# Patient Record
Sex: FEMALE | Race: WHITE
[De-identification: names, ages, dates, MRNs, and addresses within clinical notes are randomized per-mention and may not be internally consistent; named-entity substitution may affect disease eponyms.]

---

## 2018-02-03 ENCOUNTER — HOSPITAL ENCOUNTER (EMERGENCY)
Dept: HOSPITAL 44 - ED | Age: 38
Discharge: HOME | End: 2018-02-03
Payer: SELF-PAY

## 2018-02-03 DIAGNOSIS — R19.7: ICD-10-CM

## 2018-02-03 DIAGNOSIS — J06.9: Primary | ICD-10-CM

## 2018-02-03 PROCEDURE — 99282 EMERGENCY DEPT VISIT SF MDM: CPT

## 2018-02-03 PROCEDURE — A9270 NON-COVERED ITEM OR SERVICE: HCPCS

## 2018-02-03 NOTE — ED PHYSICIAN DOCUMENTATION
General Adult





- HISTORIAN


Historian: patient





- HPI


Stated Complaint: ear pain, muscle ache, diarrhea


Chief Complaint: General Adult


Onset: days ago (3)


Timing: still present


Severity: moderate


Further Comments: yes (Pt is a 38 yo female)





- ROS


CONST: other (malaise)


EYES/ENT: other (L ear pain)


CVS/RESP: cough


GI/: diarrhea


MS/SKIN/LYMPH: none





- PAST HX


Past History: other (migraine headaches)


Surgeries/Procedures: cholecystectomy, other (tonsillectomy)


Allergies/Adverse Reactions: 


 Allergies











Allergy/AdvReac Type Severity Reaction Status Date / Time


 


azithromycin Allergy   Verified 02/03/18 22:24





[From Zithromax Z-Arun]     


 


cephalexin monohydrate Allergy   Verified 02/03/18 22:24





[From Keflex]     


 


ciprofloxacin [From Cipro] Allergy   Verified 02/03/18 22:24


 


ciprofloxacin HCl Allergy   Verified 02/03/18 22:24





[From Cipro]     


 


morphine Allergy   Verified 02/03/18 22:24


 


Penicillins Allergy   Verified 02/03/18 22:24














Home Medications: 


 Ambulatory Orders











 Medication  Instructions  Recorded


 


Butalbital/Aspirin/Caffeine 1 each PO DAILY 02/03/18





[Fiorinal -40 mg Capsule]  














- SOCIAL HX


Smoking History: cigarettes





- FAMILY HX


Family History: No





- REVIEWED ASSESSMENTS


Nursing Assessment  Reviewed: Yes


Vitals Reviewed: Yes





Progress





- Progress


Progress: 





Rx Bactrim DS. 1 po bid x 10 days.





General Adult Physical Exam





- PHYSICAL EXAM


GENERAL APPEARANCE: mild distress


EENT: pharynx normal, TM erythema (L)


NECK: normal inspection, supple


RESPIRATORY: no resp distress, chest non-tender, breath sounds normal


CVS: reg rate & rhythm, heart sounds normal, equal pulses


ABDOMEN: soft, no organomegaly, normal bowel sounds


BACK: normal inspection, no CVA tenderness


SKIN: warm/dry, normal color


EXTREMITIES: non-tender, normal range of motion, no evidence of injury


NEURO: oriented X3, motor nml, sensation nml





Discharge


Clincal Impression: 


 URI





Diarrhea


Qualifiers:


 Diarrhea type: unspecified type Qualified Code(s): R19.7 - Diarrhea, 

unspecified





Referrals: 


Primary Doctor,No [Primary Care Provider] - 


Condition: Stable


Disposition: 01 HOME, SELF-CARE


Decision to Admit: NO


Decision Time: 22:43

## 2018-02-04 VITALS — SYSTOLIC BLOOD PRESSURE: 112 MMHG | DIASTOLIC BLOOD PRESSURE: 68 MMHG

## 2018-06-24 ENCOUNTER — HOSPITAL ENCOUNTER (EMERGENCY)
Dept: HOSPITAL 44 - ED | Age: 38
Discharge: HOME | End: 2018-06-24
Payer: COMMERCIAL

## 2018-06-24 VITALS — DIASTOLIC BLOOD PRESSURE: 67 MMHG | SYSTOLIC BLOOD PRESSURE: 108 MMHG

## 2018-06-24 DIAGNOSIS — K58.8: Primary | ICD-10-CM

## 2018-06-24 DIAGNOSIS — R11.0: ICD-10-CM

## 2018-06-24 PROCEDURE — A9270 NON-COVERED ITEM OR SERVICE: HCPCS

## 2018-06-24 NOTE — ED PHYSICIAN DOCUMENTATION
General Adult





- HISTORIAN


Historian: patient





- HPI


Stated Complaint: Nausea/Vomiting


Chief Complaint: Nausea,Vomiting,Diarrhea


Onset: other (on going )


Timing: better


Severity: mild


Further Comments: yes (she reports she chonically has some N/V/D and she is 

seeing a GI specialist for IBS> She was recently started on meds for this but 

"they just arent helping")


Last known Well Code/Unknown Code: Unknown





- ROS


CONST: no problems


GI/: nausea.  denies: abdominal pain (cramps and pain have improved with meds 

), vomiting, diarrhea


MS/SKIN/LYMPH: denies: rash


NEURO/PSYCH: denies: headache, dizziness





- PAST HX


Past History: other (IBS)


Surgeries/Procedures: none


Immunizations: UTD


Allergies/Adverse Reactions: 


 Allergies











Allergy/AdvReac Type Severity Reaction Status Date / Time


 


amoxicillin Allergy   Verified 06/24/18 14:42


 


azithromycin Allergy   Verified 02/03/18 22:24





[From Zithromax Z-Arun]     


 


cephalexin monohydrate Allergy   Verified 02/03/18 22:24





[From Keflex]     


 


ciprofloxacin [From Cipro] Allergy   Verified 02/03/18 22:24


 


ciprofloxacin HCl Allergy   Verified 02/03/18 22:24





[From Cipro]     


 


latex Allergy   Verified 06/24/18 14:42


 


morphine Allergy   Verified 02/03/18 22:24


 


Penicillins Allergy   Verified 02/03/18 22:24














Home Medications: 


 Ambulatory Orders











 Medication  Instructions  Recorded


 


Dicyclomine HCl 10 mg PO 06/24/18














- SOCIAL HX


Smoking History: non-smoker


Alcohol Use: none


Drug Use: none





- FAMILY HX


Family History: No





- VITAL SIGNS


Vital Signs: 





 Vital Signs











Temp Pulse Resp BP Pulse Ox


 


 98.7 F   99 H  20   108/67   98 


 


 06/24/18 14:05  06/24/18 14:05  06/24/18 14:05  06/24/18 14:05  06/24/18 14:05














- REVIEWED ASSESSMENTS


Nursing Assessment  Reviewed: Yes


Vitals Reviewed: Yes





General Adult Physical Exam





- PHYSICAL EXAM


GENERAL APPEARANCE: no distress


EENT: eye inspection normal, ENT inspection normal


NECK: normal inspection


RESPIRATORY: no resp distress, chest non-tender, breath sounds normal


CVS: reg rate & rhythm, heart sounds normal, no murmur


ABDOMEN: soft, no organomegaly, normal bowel sounds, no distension, non-tender


SKIN: warm/dry, normal color


EXTREMITIES: non-tender, normal range of motion, no evidence of injury, no edema


NEURO: oriented X3, CN's nml as tested, motor nml, sensation nml, mood/affect 

nml, cognition normal





Discharge


Clincal Impression: 


 Nausea





Irritable bowel syndrome


Qualifiers:


 Irritable bowel syndrome type: other Qualified Code(s): K58.8 - Other 

irritable bowel syndrome





Referrals: 


Primary Doctor,No [Primary Care Provider] - 2 Days


Additional Instructions: 


1. continue meds from GI specialist


2. Increase fluids


3. [place] diet


4. Zofran 4 mg every 8 hours as needed for nausea


5. Follow up with specialist tomorrow about continued symptoms


6. Return to ER for concerns 


Condition: Stable


Disposition: 01 HOME, SELF-CARE


Decision to Admit: NO


Date of Decison to Admit: 06/24/18


Decision Time: 15:21

## 2019-11-19 ENCOUNTER — HOSPITAL ENCOUNTER (EMERGENCY)
Dept: HOSPITAL 44 - ED | Age: 39
Discharge: HOME | End: 2019-11-19
Payer: COMMERCIAL

## 2019-11-19 VITALS — SYSTOLIC BLOOD PRESSURE: 135 MMHG | DIASTOLIC BLOOD PRESSURE: 74 MMHG

## 2019-11-19 DIAGNOSIS — R11.0: Primary | ICD-10-CM

## 2019-11-19 DIAGNOSIS — R19.7: ICD-10-CM

## 2019-11-19 PROCEDURE — 99283 EMERGENCY DEPT VISIT LOW MDM: CPT

## 2019-11-19 PROCEDURE — A9270 NON-COVERED ITEM OR SERVICE: HCPCS

## 2019-11-19 RX ADMIN — ONDANSETRON ONE MG: 4 TABLET, ORALLY DISINTEGRATING ORAL at 16:31

## 2019-11-19 NOTE — ED PHYSICIAN DOCUMENTATION
Nausea/Vomiting/Diarrhea





- HISTORIAN


Historian: patient





- HPI


Stated Complaint: nausea and vomiting


Chief Complaint: Nausea,Vomiting,Diarrhea


Additional Information: 


38 year old female c/o n/v/d. She states that she has had 10 diarrhea stools and

10 vomiting episodes; VSS, no signs of dehydration. She does not feel that she 

should go to work and will be needing a work excuse.


Onset: hours


Duration: none


Timing: gradual onset


Context: denies: out of country travel, bad food


Severity: mild





- Associated Symptoms


Vomiting: mild


Diarrhea: mild


Abdominal Pain: none





- ROS


CONST: none


CVS/RESP: denies: chest pain, shortness of breath


GI/: none


EYES/ENT: none


MS/SKIN/LYMPH: denies: joint pain


NEURO/PSYCH: none





- PAST HX


Past History: other (IBS)


Other History: other (depression, anxiey, bipolar)


Surgeries/Procedures: cholecystectomy, other (tonsils & adenoids, umbilical 

hernia, right thumb)


Immunizations: UTD


Allergies/Adverse Reactions: 


                                    Allergies











Allergy/AdvReac Type Severity Reaction Status Date / Time


 


amoxicillin Allergy   Verified 11/19/19 16:20


 


azithromycin Allergy   Verified 11/19/19 16:20





[From Zithromax Z-Arun]     


 


cephalexin monohydrate Allergy   Verified 11/19/19 16:20





[From Keflex]     


 


ciprofloxacin [From Cipro] Allergy   Verified 11/19/19 16:20


 


ciprofloxacin HCl Allergy   Verified 11/19/19 16:20





[From Cipro]     


 


latex Allergy   Verified 11/19/19 16:20


 


morphine Allergy   Verified 11/19/19 16:20


 


Penicillins Allergy   Verified 11/19/19 16:20














Home Medications: 


                                Ambulatory Orders











 Medication  Instructions  Recorded


 


Diazepam 30 mg PO DAILY 11/19/19


 


Promethazine HCl [Phenergan] 25 mg PO Q6 PRN #10 tablet 11/19/19


 


Sertraline HCl 150 mg PO DAILY 11/19/19


 


Trazodone HCl [Desyrel] 100 mg PO DAILY 11/19/19














- SOCIAL HX


Smoking History: greater than 1 pack/day


Alcohol Use: none


Drug Use: none





- FAMILY HX


Family History: none





- VITAL SIGNS


Vital Signs: 





                                   Vital Signs











Temp Pulse Resp BP Pulse Ox


 


 97.8 F   101 H  18   135/74   99 


 


 11/19/19 16:31  11/19/19 16:31  11/19/19 16:31  11/19/19 16:31  11/19/19 16:31














- REVIEWED ASSESSMENTS


Nursing Assessment  Reviewed: Yes


Vitals Reviewed: Yes





ED Results Lab/Radiology





- Orders


Orders: 





                                    ED Orders











 Category Date Time Status


 


 Ondansetron HCl Rapdis [Zofran Odt] Med  11/19/19 16:28 Discontinued





 4 mg PO NOW ONE   














Nausea Physical Exam





- EXAM


General Appearance: no acute distress, alert


EENT: eye inspection normal, ENT inspection normal, pharynx normal, no signs of 

dehydration, DAYANA


Neck: normal inspection, supple


Respiratory: breath sounds normal


CVS: heart sounds normal


Abdomen: non-tender, no organomegaly


Skin: warm/dry, normal color


Extremities: non-tender, normal range of motion


Neuro/Psych: oriented X3, CN's nml as tested, motor nml, sensation nml, 

mood/affect nml, cognition normal





Discharge


Clincal Impression: 


 Nausea, Diarrhea





Prescriptions: 


Promethazine HCl [Phenergan] 25 mg PO Q6 PRN #10 tablet


 PRN Reason: nausea and vomiting


Referrals: 


Primary Doctor,No [Primary Care Provider] - 2 Days


Additional Instructions: 


Take phenergan 25mg by mouth every 6 hours as needed for nausea


Immodium as needed for diarrhea


Sips of water and advance diet as tolerated


Follow up with PCP


Condition: Good


Disposition: 01 HOME, SELF-CARE


Decision to Admit: NO


Decision Time: 17:20

## 2019-12-20 ENCOUNTER — HOSPITAL ENCOUNTER (EMERGENCY)
Dept: HOSPITAL 44 - ED | Age: 39
Discharge: HOME | End: 2019-12-20
Payer: COMMERCIAL

## 2019-12-20 VITALS — SYSTOLIC BLOOD PRESSURE: 112 MMHG | DIASTOLIC BLOOD PRESSURE: 61 MMHG

## 2019-12-20 DIAGNOSIS — K52.9: Primary | ICD-10-CM

## 2019-12-20 LAB
EGFR (NON-AFRICAN): > 60
MCV RBC AUTO: 89 FL (ref 80–100)

## 2019-12-20 PROCEDURE — 96372 THER/PROPH/DIAG INJ SC/IM: CPT

## 2019-12-20 PROCEDURE — 96374 THER/PROPH/DIAG INJ IV PUSH: CPT

## 2019-12-20 PROCEDURE — 74177 CT ABD & PELVIS W/CONTRAST: CPT

## 2019-12-20 PROCEDURE — 99285 EMERGENCY DEPT VISIT HI MDM: CPT

## 2019-12-20 PROCEDURE — 96375 TX/PRO/DX INJ NEW DRUG ADDON: CPT

## 2019-12-20 PROCEDURE — 99284 EMERGENCY DEPT VISIT MOD MDM: CPT

## 2019-12-20 PROCEDURE — S1016 NON-PVC INTRAVENOUS ADMINIST: HCPCS

## 2019-12-20 PROCEDURE — 80053 COMPREHEN METABOLIC PANEL: CPT

## 2019-12-20 PROCEDURE — 85025 COMPLETE CBC W/AUTO DIFF WBC: CPT

## 2019-12-20 RX ADMIN — PROMETHAZINE HYDROCHLORIDE ONE MG: 25 INJECTION INTRAMUSCULAR; INTRAVENOUS at 20:55

## 2019-12-20 RX ADMIN — RETINOL, ERGOCALCIFEROL, .ALPHA.-TOCOPHEROL ACETATE, DL-, PHYTONADIONE, ASCORBIC ACID, NIACINAMIDE, RIBOFLAVIN 5-PHOSPHATE SODIUM, THIAMINE HYDROCHLORIDE, PYRIDOXINE HYDROCHLORIDE, DEXPANTHENOL, BIOTIN, FOLIC ACID, AND CYANOCOBALAMIN ONE MLS/HR: KIT at 20:01

## 2019-12-20 RX ADMIN — KETOROLAC TROMETHAMINE ONE MG: 30 INJECTION, SOLUTION INTRAMUSCULAR at 19:45

## 2019-12-20 RX ADMIN — ONDANSETRON ONE MG: 2 INJECTION INTRAMUSCULAR; INTRAVENOUS at 19:45

## 2019-12-20 NOTE — DIAGNOSTIC IMAGING REPORT
PATIENT MR#:   [id number]

PATIENT ACCT#: [id number]

PATIENT NAME:  JULIEN MOHAN

YOB: 1980

REFERRING PHYSICIAN: Addis Lynch

EXAM DATE:        12/20/2019

ACCESSION NUMBER: [id number]

EXAM DESCRIPTION: CT ABD   PELVIS W/ CON



HISTORY:  39-year-old female with right lower quadrant abdominal pain for 2 days, history of umbilica
l hernia repair



COMPARISON: None available.



TECHNIQUE:  Helical CT images of the abdomen and pelvis were performed with 90 ml Omnipaque 350 IV co
ntrast.  Sagittal

and coronal reformatted images were obtained.



FINDINGS: 

CT abdomen:  The lung bases are clear.  The liver measures 21 cm cephalocaudal and is likely diffusel
y fatty

infiltrated.  The gallbladder is surgically absent.  The spleen, pancreas, kidneys, and adrenal gland
s are unremarkable.

No abdominal aortic aneurysm.  The left renal vein is retroaortic.



CT pelvis:  No abnormal bowel dilatation, free air, free fluid, or suspicious adenopathy.  There is m
ild sigmoid colon

diverticulosis without evidence of acute diverticulitis.  The appendix and urinary bladder are normal
 in appearance. 

There is a lumbosacral transitional vertebrae.



IMPRESSION:



1.  Hepatomegaly and probable diffuse fatty infiltration of the liver.

2.  Postoperative changes of cholecystectomy.

3.  No evidence of bowel obstruction, acute appendicitis, or other acute process in the abdomen or pe
lvis.



Electronically signed by: Pelon Azar on 12/20/2019 20:55:21









Read by:        Dr. Pelon Azar

Transcribed by:   

Transcribed Date: 

Electronically signed by: Dr. Pelon Azar

Date signed: 12/20/2019 8:59:15 PM

## 2019-12-20 NOTE — ED PHYSICIAN DOCUMENTATION
Abdominal Pain





- HISTORIAN


Historian: patient





- HPI


Stated Complaint: abdominal pain


Chief Complaint: Abdominal Pain


Additonal Information: 





Patient presents to ED with RLQ admoninal pain, nausea/vomiting, diarrhea since 

Wednesday (3 days).  She also reports low grade fever of 100.3.  She has a 

history of IBS but states this pain is different.  The pain is sharp stabbing, 

10/10, worse with walking.  


Onset: days ago (3)


Duration: none


Timing: still present


Context: denies: out of country travel, bad food


Severity: severe


Quality: sharp, stabbing


Associated Symptoms: fever, nausea, vomiting, diarrhea


Exacerbated by: walking


Relieved by: nothing


Further Comments: no





- ROS


CONST: no problems


GI/: denies: bloody urine, bloody stools


CVS/RESP: none


EYES/ENT: none


MS/SKIN/LYMPH: none


NEURO/PSYCH: none





- SOCIAL HX


Smoking History: non-smoker


Alcohol Use: none


Drug Use: none





- FAMILY HX


Family History: no significant history





- PAST HX


Past History: none


Ischemic Bowel Risk Factors: none


Other History: none


Surgeries/Procedures: cholecystectomy


Home Medications: 


                                Ambulatory Orders











 Medication  Instructions  Recorded


 


Diazepam 30 mg PO DAILY 19


 


Sertraline HCl 150 mg PO DAILY 19


 


Trazodone HCl [Desyrel] 100 mg PO DAILY 19


 


Promethazine HCl 25 mg RC Q6 #20 supp.rect 19











Allergies/Adverse Reactions: 


                                    Allergies











Allergy/AdvReac Type Severity Reaction Status Date / Time


 


amoxicillin Allergy   Verified 19 19:18


 


azithromycin Allergy   Verified 19 19:18





[From Zithromax Z-Arun]     


 


cephalexin monohydrate Allergy   Verified 19 19:18





[From Keflex]     


 


ciprofloxacin [From Cipro] Allergy   Verified 19 19:18


 


ciprofloxacin HCl Allergy   Verified 19 19:18





[From Cipro]     


 


latex Allergy   Verified 19 19:18


 


morphine Allergy   Verified 19 19:18


 


Penicillins Allergy   Verified 19 19:18














- VITAL SIGNS


Vital Signs: 


                                   Vital Signs











Temp Pulse Resp BP Pulse Ox


 


 99.3 F   88   18   108/68   98 


 


 19 18:52  19 18:52  19 18:52  19 18:52  19 18:52














- REVIEWED ASSESSMENTS


Nursing Assessment  Reviewed: Yes


Vitals Reviewed: Yes





ED Results Lab/Radiology





- Lab Results


Lab Results: 


                                   Lab Results











  19





  19:00 19:00


 


WBC    8.30 K/ul K/ul





    (4.00-12.00) 


 


RBC    4.45 M/ul M/ul





    (3.90-5.20) 


 


Hgb    13.4 g/dL g/dL





    (11.5-16.0) 


 


Hct    39.5 % %





    (34.5-46.5) 


 


MCV    89.0 fl fl





    (80.0-100.0) 


 


MCH    30.1 pg pg





    (28.0-34.0) 


 


MCHC    34.0 g/dL g/dL





    (30.0-36.0) 


 


RDW    12.0 % %





    (11.3-14.3) 


 


Plt Count    364 K/mm3 K/mm3





    (130-400) 


 


Neut # (Auto)    Pending 





   


 


Lymph # (Auto)    Pending 





   


 


Mono # (Auto)    Pending 





   


 


Eos # (Auto)    Pending 





   


 


Baso # (Auto)    Pending 





   


 


Seg Neutrophils %    Pending 





   


 


Sodium  141 mmol/L mmol/L  





   (137-145)  


 


Potassium  3.6 mmol/L mmol/L  





   (3.5-5.1)  


 


Chloride  106 mmol/L mmol/L  





   ()  


 


Carbon Dioxide  25 mmol/L mmol/L  





   (22-30)  


 


Anion Gap  13.6   





   


 


BUN  10 mg/dL mg/dL  





   (7-17)  


 


Creatinine  0.76 mg/dL mg/dL  





   (0.52-1.04)  


 


Estimated Creat Clear  152   





   


 


Est GFR ( Amer)  > 60   





  (60 - ) 


 


Est GFR (Non-Af Amer)  > 60   





  (60 - ) 


 


Glucose  93 mg/dL mg/dL  





   ()  


 


Calcium  9.6 mg/dL mg/dL  





   (8.4-10.2)  


 


Total Bilirubin  0.4 mg/dL mg/dL  





   (0.2-1.3)  


 


AST  39 U/L U/L  





   (15-46)  


 


ALT  18 U/L U/L  





   (4-35)  


 


Alkaline Phosphatase  77 U/L U/L  





   ()  


 


Total Protein  7.7 g/dL g/dL  





   (6.3-8.2)  


 


Albumin  4.5 g/dL g/dL  





   (3.5-5.0)  














- Radiology


Radiology Impressions: 


Report Submission Date: Dec 20, 2019 8:55:21 PM CST


Patient       Study


Name:   JULIEN MOHAN       Date:   Dec 20, 2019 8:35:46 PM CST


MRN:   [id number]       Modality Type:   CT\SR


Gender:   F       Description:   CT ABD PELVIS W/ CON


:   84       Institution:   [facility]


Physician:   PADMA ROBLES


     Accession:    [id number]


 


 


HISTORY:  39-year-old female with right lower quadrant abdominal pain for 2 

days, history of umbilical hernia repair





COMPARISON: None available.





TECHNIQUE:  Helical CT images of the abdomen and pelvis were performed with 90 

ml Omnipaque 350 IV contrast.  Sagittal and coronal reformatted images were 

obtained.





FINDINGS:


CT abdomen:  The lung bases are clear.  The liver measures 21 cm cephalocaudal 

and is likely diffusely fatty infiltrated.  The gallbladder is surgically 

absent.  The spleen, pancreas, kidneys, and adrenal glands are unremarkable.  No

abdominal aortic aneurysm.  The left renal vein is retroaortic.





CT pelvis:  No abnormal bowel dilatation, free air, free fluid, or suspicious 

adenopathy.  There is mild sigmoid colon diverticulosis without evidence of 

acute diverticulitis.  The appendix and urinary bladder are normal in 

appearance.  There is a lumbosacral transitional vertebrae.





IMPRESSION:





1.  Hepatomegaly and probable diffuse fatty infiltration of the liver.


2.  Postoperative changes of cholecystectomy.


3.  No evidence of bowel obstruction, acute appendicitis, or other acute process

in the abdomen or pelvis.


 


Electronically signed on Dec 20, 2019 8:55:21 PM CST by:


Pelon Azar





- Orders


Orders: 


                                    ED Orders











 Category Date Time Status


 


 Place IV Lock 1T Care  19 19:12 Active


 


 CT ABD & PELVIS W/ CON Stat Exams  19 Completed


 


 CBC/PLATELET/DIFF Routine Lab  19 19:00 Results


 


 CMP Routine Lab  19 19:00 Completed


 


 0.9 % Sodium Chloride [Normal Saline] 1,000 ml Med  19 19:14 Discontinued





 IV Q1H   


 


 Ketorolac Tromethamine [Toradol] Med  19 19:14 Discontinued





 30 mg IV NOW ONE   


 


 Ondansetron HCl/Pf [Zofran] Med  19 19:14 Discontinued





 4 mg IVP NOW ONE   


 


 Promethazine HCl [Phenergan] Med  19 20:26 Discontinued





 25 mg IM ONCE ONE   














Abdominal Pain Physical Exam





- Physical Exam


General Appearance: no acute distress, alert


EENT: DAYANA


RESPIRATORY: no resp distress, breath sounds normal


CVS: reg rate & rhythm, heart sounds normal


ABDOMEN: soft, tenderness (RLQ)


BACK: normal inspection


SKIN: warm/dry, normal color


EXTREMITIES: non-tender, normal range of motion, no evidence of injury


NEURO: oriented X3


Vital Signs: 


                                   Vital Signs











Temp Pulse Resp BP Pulse Ox


 


 99.3 F   88   18   108/68   98 


 


 19 18:52  19 18:52  19 18:52  19 18:52  19 18:52














Discharge


Clincal Impression: 


 Gastroenteritis





Prescriptions: 


Promethazine HCl 25 mg RC Q6 #20 supp.rect


Referrals: 


Primary Doctor,No [Primary Care Provider] - 2 Days


Additional Instructions: 


1.  Promethazine every 6 hours as needed for nausea


2.  Imodium as needed for diarrhea


3.  Tylenol 650 mg every 4 hours and/or Ibuprofen 600mg every 6 hours as needed 

for pain/fever


4.  Small sips of fluids every 15 minutes advance as tolerated


5.  Follow up with PCP within 1 week


6.  Return to ER for new or worsening symptoms


Condition: Stable


Disposition: 01 HOME, SELF-CARE


Decision to Admit: NO


Date of Decison to Admit: 19


Decision Time: 21:06

## 2019-12-21 LAB
BASOPHILS NFR BLD: 1 % (ref 0–1.5)
NEUTROPHILS #: 4.5 # K/UL (ref 1.4–7.7)